# Patient Record
(demographics unavailable — no encounter records)

---

## 2025-01-31 NOTE — ASSESSMENT
[FreeTextEntry1] : The condition was explained to the patient and his mother.  I explained that sprains / ligament injuries can take 1-2 years for maximal healing.  Recommend a course of conservative treatment. Discussed risk of persistent pain, stiffness, instability, post-traumatic arthritis. Anticipate 1 year for swelling to stabilize, may have permanent enlargement of joint. They expressed understanding and are in agreement with treatment plan.  - I personally applied a fiberglass short arm thumb spica cast.  Reviewed cast care instructions - do not get cast wet, do not remove cast, do not put objects down cast. The importance of elevation was discussed with the patient. We discussed that there is a moderate risk of complications and morbidity with casting, including skin burn, skin irritation, skin breakdown, stiffness from immobilization, and compartment syndrome. We discussed the sign/symptoms of compartment syndrome that would warrant emergent evaluation in the office or ER, including severe swelling, worsening pain, numbness/tingling that does no resolve with elevation, and pale/white/cold fingers. They expressed understanding.  - light activity - avoid pinching and heavy gripping/lifting. no gym/sports.  F/u 2 weeks for cast check.

## 2025-01-31 NOTE — HISTORY OF PRESENT ILLNESS
[de-identified] : 1/31/25: HPI obtained from patient's parent as independent historian due to patient's age making them an unreliable historian. 12yo male (RHD) presents for LEFT thumb pain after it was bent backwards playing basketball on 1/25/25. I reviewed external record - note from Center  on 1/26/25 => XR of L thumb report indicates lucency concerning for distal phalanx fx. Presents with short thumb spica brace. [FreeTextEntry5] : ABELINO MANMILY january [RHD] 13 year old male is here today for evaluation of LEFT thumb pain since 01/25/25 after finger was bent backwards during a basketball game. went to center urgent care on 01/26/25 +xrays (unavailable).

## 2025-01-31 NOTE — IMAGING
[de-identified] : LEFT HAND skin intact. mild swelling around thumb MPJ. TTP to thumb ulnar MPJ. wrist ROM: good extension, flexion. good pronation, supination. good EPL, FPL. fingers good extension, flex to full fist. good finger abduction and adduction. SILT to median, ulnar, radial distribution. palpable radial pulse, brisk cap refill all digits. no triggering.  thumb MPJ stress: - RCL: no pain, no instability. - UCL: + pain. mild increased opening, but good endpoint.  XRAYS OF LEFT THUMB (3 views - PA, OBLIQUE, AND LATERAL VIEWS): no acute displaced fracture or dislocation. open physes.

## 2025-02-18 NOTE — HISTORY OF PRESENT ILLNESS
[de-identified] : 2/18/25: presents with mother. f/u LEFT thumb MPJ sprain. in thumb spica cast x 2.5 weeks.  1/31/25: HPI obtained from patient's parent as independent historian due to patient's age making them an unreliable historian. 14yo male (RHD) presents for LEFT thumb pain after it was bent backwards playing basketball on 1/25/25. I reviewed external record - note from [facility]  on 1/26/25 => XR of L thumb report indicates lucency concerning for distal phalanx fx. Presents with short thumb spica brace. [FreeTextEntry5] : ABELINO is here today to follow up on his LEFT thumb. pt denies pain today. reports some looseness.

## 2025-02-18 NOTE — IMAGING
[de-identified] : LEFT HAND thumb spica cast intact. SILT to median, ulnar, radial distribution. brisk cap refill all digits. no triggering.  @1/31/25 XRAYS OF LEFT THUMB (3 views - PA, OBLIQUE, AND LATERAL VIEWS): no acute displaced fracture or dislocation. open physes.

## 2025-02-18 NOTE — ASSESSMENT
[FreeTextEntry1] : - maintain fiberglass short arm thumb spica cast.  - light activity - avoid pinching and heavy gripping/lifting. no gym/sports.  F/u 4 weeks for cast removal.

## 2025-04-08 NOTE — ASSESSMENT
[FreeTextEntry1] : - d/c thumb spica brace. - advance activity as pain allows. ok gym/sports.  F/u PRN.

## 2025-04-08 NOTE — HISTORY OF PRESENT ILLNESS
[de-identified] : 4/8/25: presents with mother. f/u LEFT thumb MPJ sprain. wearing thumb spica brace for school.  3/18/25: presents with mother. f/u LEFT thumb MPJ sprain. in thumb spica cast x 6.5 weeks.  2/18/25: presents with mother. f/u LEFT thumb MPJ sprain. in thumb spica cast x 2.5 weeks.  1/31/25: HPI obtained from patient's parent as independent historian due to patient's age making them an unreliable historian. 14yo male (RHD) presents for LEFT thumb pain after it was bent backwards playing basketball on 1/25/25. I reviewed external record - note from [facility]  on 1/26/25 => XR of L thumb report indicates lucency concerning for distal phalanx fx. Presents with short thumb spica brace. [FreeTextEntry5] : ABELINO is here today to follow up on his LEFT thumb. pt denies pain today. wearing brace when at school.

## 2025-04-08 NOTE — IMAGING
[de-identified] : LEFT HAND skin intact. no swelling. non-TTP to thumb. good EPL, FPL. thumb opposition to base of SF. fingers good extension, flex to full fist. SILT to median, ulnar, radial distribution. brisk cap refill all digits. no triggering.  thumb MPJ stress: - RCL: no pain, no instability. - UCL: no pain, no instability at neutral and mild flexion.  @1/31/25 XRAYS OF LEFT THUMB (3 views - PA, OBLIQUE, AND LATERAL VIEWS): no acute displaced fracture or dislocation. open physes.